# Patient Record
Sex: MALE | Race: BLACK OR AFRICAN AMERICAN | NOT HISPANIC OR LATINO | Employment: OTHER | URBAN - NONMETROPOLITAN AREA
[De-identification: names, ages, dates, MRNs, and addresses within clinical notes are randomized per-mention and may not be internally consistent; named-entity substitution may affect disease eponyms.]

---

## 2021-01-02 ENCOUNTER — HISTORICAL (OUTPATIENT)
Dept: ADMINISTRATIVE | Facility: HOSPITAL | Age: 86
End: 2021-01-02

## 2022-02-16 LAB
AGE: 86
ALBUMIN SERPL-MCNC: 4 G/DL (ref 3.4–5)
ALBUMIN/GLOB SERPL: 1.4 {RATIO}
ALP SERPL-CCNC: 67 U/L (ref 50–144)
ALT SERPL-CCNC: 17 U/L (ref 1–45)
ANION GAP SERPL CALC-SCNC: 7 MMOL/L (ref 2–13)
AST SERPL-CCNC: 27 U/L (ref 17–59)
BASOPHILS # BLD AUTO: 0.01 10*3/UL (ref 0.01–0.08)
BASOPHILS # BLD AUTO: 0.03 10*3/UL (ref 0.01–0.08)
BASOPHILS NFR BLD AUTO: 0.3 % (ref 0.1–1.2)
BASOPHILS NFR BLD AUTO: 0.5 % (ref 0.1–1.2)
BILIRUB SERPL-MCNC: 0.34 MG/DL (ref 0–1)
BUN SERPL-MCNC: 20 MG/DL (ref 7–20)
CALCIUM SERPL-MCNC: 9.5 MG/DL (ref 8.4–10.2)
CHLORIDE SERPL-SCNC: 104 MMOL/L (ref 94–110)
CO2 SERPL-SCNC: 30 MMOL/L (ref 21–32)
CREAT SERPL-MCNC: 1.17 MG/DL (ref 0.66–1.25)
CREAT/UREA NIT SERPL: 17.1 (ref 12–20)
EOSINOPHIL # BLD AUTO: 0.17 10*3/UL (ref 0.04–0.54)
EOSINOPHIL # BLD AUTO: 0.26 10*3/UL (ref 0.04–0.54)
EOSINOPHIL NFR BLD AUTO: 4.3 % (ref 0.7–7)
EOSINOPHIL NFR BLD AUTO: 4.5 % (ref 0.7–7)
ERYTHROCYTE [DISTWIDTH] IN BLOOD BY AUTOMATED COUNT: 14.4 % (ref 11.6–14.4)
ERYTHROCYTE [DISTWIDTH] IN BLOOD BY AUTOMATED COUNT: 14.6 % (ref 11.6–14.4)
EST. AVERAGE GLUCOSE BLD GHB EST-MCNC: 123 MG/DL (ref 70–115)
GLOBULIN SER-MCNC: 2.9 G/DL (ref 2–3.9)
GLUCOSE SERPL-MCNC: 93 MG/DL (ref 70–115)
HBA1C MFR BLD: 6.1 % (ref 4–6)
HCT VFR BLD AUTO: 37.7 % (ref 36–52)
HCT VFR BLD AUTO: 43 % (ref 36–52)
HGB BLD-MCNC: 12.5 G/DL (ref 13–18)
HGB BLD-MCNC: 13.8 G/DL (ref 13–18)
IMM GRANULOCYTES # BLD AUTO: 0.01 10*3/UL (ref 0–0.03)
IMM GRANULOCYTES # BLD AUTO: 0.02 10*3/UL (ref 0–0.03)
IMM GRANULOCYTES NFR BLD AUTO: 0.3 % (ref 0–0.5)
IMM GRANULOCYTES NFR BLD AUTO: 0.3 % (ref 0–0.5)
LYMPHOCYTES # BLD AUTO: 1.46 10*3/UL (ref 1.32–3.57)
LYMPHOCYTES # BLD AUTO: 2.57 10*3/UL (ref 1.32–3.57)
LYMPHOCYTES NFR BLD AUTO: 38.4 % (ref 20–55)
LYMPHOCYTES NFR BLD AUTO: 43 % (ref 20–55)
MANUAL DIFF? (OHS): NORMAL
MANUAL DIFF? (OHS): NORMAL
MCH RBC QN AUTO: 26.5 PG (ref 27–34)
MCH RBC QN AUTO: 26.9 PG (ref 27–34)
MCHC RBC AUTO-ENTMCNC: 32.1 G/DL (ref 31–37)
MCHC RBC AUTO-ENTMCNC: 33.2 G/DL (ref 31–37)
MCV RBC AUTO: 81.1 FL (ref 79–99)
MCV RBC AUTO: 82.5 FL (ref 79–99)
MONOCYTES # BLD AUTO: 0.42 10*3/UL (ref 0.3–0.82)
MONOCYTES # BLD AUTO: 0.54 10*3/UL (ref 0.3–0.82)
MONOCYTES NFR BLD AUTO: 11.1 % (ref 4.7–12.5)
MONOCYTES NFR BLD AUTO: 9 % (ref 4.7–12.5)
NEUTROPHILS # BLD AUTO: 1.73 10*3/UL (ref 1.78–5.38)
NEUTROPHILS # BLD AUTO: 2.56 10*3/UL (ref 1.78–5.38)
NEUTROPHILS NFR BLD AUTO: 42.9 % (ref 37–73)
NEUTROPHILS NFR BLD AUTO: 45.4 % (ref 37–73)
PLATELET # BLD AUTO: 178 10*3/UL (ref 140–371)
PLATELET # BLD AUTO: 187 10*3/UL (ref 140–371)
PMV BLD AUTO: 10.1 FL (ref 9.4–12.4)
PMV BLD AUTO: 10.5 FL (ref 9.4–12.4)
POTASSIUM SERPL-SCNC: 4 MMOL/L (ref 3.5–5.1)
PROT SERPL-MCNC: 6.9 G/DL (ref 6.3–8.2)
RBC # BLD AUTO: 4.65 10*6/UL (ref 4–6)
RBC # BLD AUTO: 5.21 10*6/UL (ref 4–6)
SODIUM SERPL-SCNC: 141 MMOL/L (ref 135–145)
TSH SERPL-ACNC: 0.58 M[IU]/L (ref 0.36–3.74)
URATE SERPL-MCNC: 7.5 MG/DL (ref 2.6–7.2)
VIT B12 SERPL-MCNC: 536 PG/ML (ref 211–946)
WBC # SPEC AUTO: 3.8 10*3/UL (ref 4–11.5)
WBC # SPEC AUTO: 6 10*3/UL (ref 4–11.5)

## 2022-04-11 ENCOUNTER — HISTORICAL (OUTPATIENT)
Dept: ADMINISTRATIVE | Facility: HOSPITAL | Age: 87
End: 2022-04-11

## 2022-04-24 VITALS
WEIGHT: 190.69 LBS | SYSTOLIC BLOOD PRESSURE: 134 MMHG | DIASTOLIC BLOOD PRESSURE: 72 MMHG | HEIGHT: 73 IN | BODY MASS INDEX: 25.27 KG/M2

## 2022-05-11 ENCOUNTER — HISTORICAL (OUTPATIENT)
Dept: ADMINISTRATIVE | Facility: HOSPITAL | Age: 87
End: 2022-05-11

## 2022-05-14 ENCOUNTER — HISTORICAL (OUTPATIENT)
Dept: ADMINISTRATIVE | Facility: HOSPITAL | Age: 87
End: 2022-05-14

## 2023-04-20 ENCOUNTER — OFFICE VISIT (OUTPATIENT)
Dept: FAMILY MEDICINE | Facility: CLINIC | Age: 88
End: 2023-04-20
Payer: MEDICARE

## 2023-04-20 VITALS
BODY MASS INDEX: 24.39 KG/M2 | OXYGEN SATURATION: 100 % | SYSTOLIC BLOOD PRESSURE: 120 MMHG | TEMPERATURE: 98 F | HEIGHT: 73 IN | HEART RATE: 75 BPM | DIASTOLIC BLOOD PRESSURE: 78 MMHG | WEIGHT: 184 LBS

## 2023-04-20 DIAGNOSIS — K40.90 RIGHT INGUINAL HERNIA: ICD-10-CM

## 2023-04-20 PROBLEM — M17.0 OSTEOARTHRITIS OF BOTH KNEES: Status: ACTIVE | Noted: 2023-04-20

## 2023-04-20 PROBLEM — M47.816 SPONDYLOSIS OF LUMBAR SPINE: Status: ACTIVE | Noted: 2023-04-20

## 2023-04-20 PROBLEM — E78.5 HYPERLIPIDEMIA: Status: ACTIVE | Noted: 2023-04-20

## 2023-04-20 PROBLEM — I10 HYPERTENSION: Status: ACTIVE | Noted: 2023-04-20

## 2023-04-20 PROBLEM — M19.90 OSTEOARTHRITIS: Status: ACTIVE | Noted: 2023-04-20

## 2023-04-20 PROBLEM — B19.20 HEPATITIS C VIRUS INFECTION: Status: ACTIVE | Noted: 2023-04-20

## 2023-04-20 PROCEDURE — 99213 OFFICE O/P EST LOW 20 MIN: CPT | Mod: ,,, | Performed by: FAMILY MEDICINE

## 2023-04-20 PROCEDURE — 1160F PR REVIEW ALL MEDS BY PRESCRIBER/CLIN PHARMACIST DOCUMENTED: ICD-10-PCS | Mod: ,,, | Performed by: FAMILY MEDICINE

## 2023-04-20 PROCEDURE — 99213 PR OFFICE/OUTPT VISIT, EST, LEVL III, 20-29 MIN: ICD-10-PCS | Mod: ,,, | Performed by: FAMILY MEDICINE

## 2023-04-20 PROCEDURE — 1101F PT FALLS ASSESS-DOCD LE1/YR: CPT | Mod: ,,, | Performed by: FAMILY MEDICINE

## 2023-04-20 PROCEDURE — 1101F PR PT FALLS ASSESS DOC 0-1 FALLS W/OUT INJ PAST YR: ICD-10-PCS | Mod: ,,, | Performed by: FAMILY MEDICINE

## 2023-04-20 PROCEDURE — 3288F PR FALLS RISK ASSESSMENT DOCUMENTED: ICD-10-PCS | Mod: ,,, | Performed by: FAMILY MEDICINE

## 2023-04-20 PROCEDURE — 1160F RVW MEDS BY RX/DR IN RCRD: CPT | Mod: ,,, | Performed by: FAMILY MEDICINE

## 2023-04-20 PROCEDURE — 3288F FALL RISK ASSESSMENT DOCD: CPT | Mod: ,,, | Performed by: FAMILY MEDICINE

## 2023-04-20 PROCEDURE — 1159F MED LIST DOCD IN RCRD: CPT | Mod: ,,, | Performed by: FAMILY MEDICINE

## 2023-04-20 PROCEDURE — 1159F PR MEDICATION LIST DOCUMENTED IN MEDICAL RECORD: ICD-10-PCS | Mod: ,,, | Performed by: FAMILY MEDICINE

## 2023-04-20 RX ORDER — AMLODIPINE AND BENAZEPRIL HYDROCHLORIDE 10; 20 MG/1; MG/1
1 CAPSULE ORAL DAILY
COMMUNITY
End: 2023-04-20

## 2023-04-20 RX ORDER — FINASTERIDE 5 MG/1
5 TABLET, FILM COATED ORAL DAILY
COMMUNITY

## 2023-04-20 RX ORDER — HYDROCHLOROTHIAZIDE 12.5 MG/1
12.5 CAPSULE ORAL DAILY
COMMUNITY
Start: 2022-11-09

## 2023-04-20 RX ORDER — AMLODIPINE BESYLATE 5 MG/1
5 TABLET ORAL DAILY
COMMUNITY

## 2023-04-20 RX ORDER — SIMVASTATIN 40 MG/1
40 TABLET, FILM COATED ORAL NIGHTLY
COMMUNITY

## 2023-04-20 RX ORDER — OMEPRAZOLE 20 MG/1
20 CAPSULE, DELAYED RELEASE ORAL DAILY
COMMUNITY

## 2023-04-20 NOTE — PROGRESS NOTES
"SUBJECTIVE:  HPI    Dejon Gallegos is a 88 y.o. male here for Groin Swelling (Since last week).  He noticed right groin swelling last week.  No pain.  No discomfort.  No change in his bowel habits.  He has a prior history of right inguinal hernia repair in the 1970s.    Erics allergies, medications, history, and problem list were updated as appropriate.    ROS:  Pertinent ROS as above, otherwise negative    OBJECTIVE:  Vital signs  Visit Vitals  /78 (BP Location: Left arm, Patient Position: Sitting, BP Method: Medium (Manual))   Pulse 75   Temp 98.2 °F (36.8 °C) (Oral)   Ht 6' 1.03" (1.855 m)   Wt 83.5 kg (184 lb)   SpO2 100%   BMI 24.26 kg/m²          PHYSICAL EXAM:  General:  Awake, alert, no acute distress  :  Reducible, nontender right femoral/inguinal hernia      ASSESSMENT/PLAN:  1. Right inguinal hernia  Assessment & Plan:  The patient reports that he would like to go to [place] at the [facility] to have this fixed.  He does not have any family locally but he does have a daughter who lives in [place] that would be able to care for him in the perioperative period.        "

## 2023-04-20 NOTE — ASSESSMENT & PLAN NOTE
The patient reports that he would like to go to [place] at the [facility] to have this fixed.  He does not have any family locally but he does have a daughter who lives in [place] that would be able to care for him in the perioperative period.

## 2023-05-15 ENCOUNTER — APPOINTMENT (OUTPATIENT)
Dept: RADIOLOGY | Facility: CLINIC | Age: 88
End: 2023-05-15
Attending: FAMILY MEDICINE
Payer: MEDICARE

## 2023-05-15 ENCOUNTER — OFFICE VISIT (OUTPATIENT)
Dept: FAMILY MEDICINE | Facility: CLINIC | Age: 88
End: 2023-05-15
Payer: MEDICARE

## 2023-05-15 VITALS
WEIGHT: 185.81 LBS | SYSTOLIC BLOOD PRESSURE: 92 MMHG | HEART RATE: 59 BPM | OXYGEN SATURATION: 98 % | DIASTOLIC BLOOD PRESSURE: 56 MMHG | BODY MASS INDEX: 24.63 KG/M2 | TEMPERATURE: 98 F | HEIGHT: 73 IN

## 2023-05-15 DIAGNOSIS — M47.816 SPONDYLOSIS OF LUMBAR SPINE: ICD-10-CM

## 2023-05-15 DIAGNOSIS — M47.816 SPONDYLOSIS OF LUMBAR SPINE: Primary | ICD-10-CM

## 2023-05-15 PROCEDURE — 99214 PR OFFICE/OUTPT VISIT, EST, LEVL IV, 30-39 MIN: ICD-10-PCS | Mod: ,,, | Performed by: FAMILY MEDICINE

## 2023-05-15 PROCEDURE — 1159F PR MEDICATION LIST DOCUMENTED IN MEDICAL RECORD: ICD-10-PCS | Mod: ,,, | Performed by: FAMILY MEDICINE

## 2023-05-15 PROCEDURE — 1160F RVW MEDS BY RX/DR IN RCRD: CPT | Mod: ,,, | Performed by: FAMILY MEDICINE

## 2023-05-15 PROCEDURE — 72114 X-RAY EXAM L-S SPINE BENDING: CPT | Mod: TC,RHCUB | Performed by: FAMILY MEDICINE

## 2023-05-15 PROCEDURE — 1159F MED LIST DOCD IN RCRD: CPT | Mod: ,,, | Performed by: FAMILY MEDICINE

## 2023-05-15 PROCEDURE — 3288F FALL RISK ASSESSMENT DOCD: CPT | Mod: ,,, | Performed by: FAMILY MEDICINE

## 2023-05-15 PROCEDURE — 3288F PR FALLS RISK ASSESSMENT DOCUMENTED: ICD-10-PCS | Mod: ,,, | Performed by: FAMILY MEDICINE

## 2023-05-15 PROCEDURE — 1160F PR REVIEW ALL MEDS BY PRESCRIBER/CLIN PHARMACIST DOCUMENTED: ICD-10-PCS | Mod: ,,, | Performed by: FAMILY MEDICINE

## 2023-05-15 PROCEDURE — 99214 OFFICE O/P EST MOD 30 MIN: CPT | Mod: ,,, | Performed by: FAMILY MEDICINE

## 2023-05-15 PROCEDURE — 1101F PT FALLS ASSESS-DOCD LE1/YR: CPT | Mod: ,,, | Performed by: FAMILY MEDICINE

## 2023-05-15 PROCEDURE — 1101F PR PT FALLS ASSESS DOC 0-1 FALLS W/OUT INJ PAST YR: ICD-10-PCS | Mod: ,,, | Performed by: FAMILY MEDICINE

## 2023-05-15 RX ORDER — IBUPROFEN 600 MG/1
600 TABLET ORAL EVERY 8 HOURS
Qty: 63 TABLET | Refills: 0 | Status: SHIPPED | OUTPATIENT
Start: 2023-05-15 | End: 2023-06-05

## 2023-05-15 NOTE — PROGRESS NOTES
"SUBJECTIVE:  HPI    Dejon Gallegos is a 88 y.o. male here for Back Pain.  1-2 week history of left-sided low back pain that began gradually when he awakened 1 morning.  He denies any exacerbating injury.  The pain is worsened whenever he flexes to tie his shoes or when he stands up quickly.  He denies any radicular pain.  He denies any fever, chills, dysuria, change in voiding habits.  He denies any rectal pain or pressure.      Erics allergies, medications, history, and problem list were updated as appropriate.    ROS:  Pertinent ROS as above, otherwise negative    OBJECTIVE:  Vital signs  Visit Vitals  BP (!) 92/56 (BP Location: Right arm, Patient Position: Sitting)   Pulse (!) 59   Temp 98.1 °F (36.7 °C) (Temporal)   Ht 6' 1.03" (1.855 m)   Wt 84.3 kg (185 lb 12.8 oz)   SpO2 98%   BMI 24.49 kg/m²          PHYSICAL EXAM:  General:  Awake, alert, no acute distress  Back:  Tenderness to palpation just to the left of midline near L5 and S1.  Straight leg raise negative bilaterally.  Pain with hyperextension.  Neuro:  Motor 4/5 bilateral lower extremities, equal and symmetrical.  Sensation light touch intact.    Lumbar spine x-rays, my interpretation prior to radiology report:  Severe L5 and S1 degenerative joint disease and degenerative disc disease changes with moderate-severe disease superior to L5.  Bridging spurs and spondylosis the entire lumbar spine.      ASSESSMENT/PLAN:  1. Spondylosis of lumbar spine  Overview:  X-rays June 27, 2017: Severe L5 and S1 degenerative joint disease and degenerative disc disease with moderate degenerative changes above L5    Orders:  -     X-Ray Lumbar Complete Including Flex And Ext; Future; Expected date: 05/15/2023  -     ibuprofen (ADVIL,MOTRIN) 600 MG tablet; Take 1 tablet (600 mg total) by mouth every 8 (eight) hours. for 21 days  Dispense: 63 tablet; Refill: 0    Ibuprofen 600 mg 3 times a day for 21 days.      Offered physical therapy referral.  Patient declined at this " time.

## 2024-07-22 DIAGNOSIS — R31.9 HEMATURIA: Primary | ICD-10-CM

## 2024-08-01 DIAGNOSIS — J44.9 COPD (CHRONIC OBSTRUCTIVE PULMONARY DISEASE): Primary | ICD-10-CM

## 2024-08-14 ENCOUNTER — OFFICE VISIT (OUTPATIENT)
Dept: UROLOGY | Facility: CLINIC | Age: 89
End: 2024-08-14
Payer: OTHER GOVERNMENT

## 2024-08-14 VITALS — HEIGHT: 75 IN | BODY MASS INDEX: 23.22 KG/M2

## 2024-08-14 DIAGNOSIS — R31.21 ASYMPTOMATIC MICROSCOPIC HEMATURIA: Primary | ICD-10-CM

## 2024-08-14 LAB
BILIRUBIN, UA POC OHS: NEGATIVE
BLOOD, UA POC OHS: ABNORMAL
CLARITY, UA POC OHS: ABNORMAL
COLOR, UA POC OHS: YELLOW
GLUCOSE, UA POC OHS: NEGATIVE
KETONES, UA POC OHS: NEGATIVE
LEUKOCYTES, UA POC OHS: ABNORMAL
NITRITE, UA POC OHS: NEGATIVE
PH, UA POC OHS: 6.5
PROTEIN, UA POC OHS: NEGATIVE
SPECIFIC GRAVITY, UA POC OHS: 1.02
UROBILINOGEN, UA POC OHS: 0.2

## 2024-08-14 PROCEDURE — 99204 OFFICE O/P NEW MOD 45 MIN: CPT | Mod: S$GLB,,,

## 2024-08-14 PROCEDURE — 81003 URINALYSIS AUTO W/O SCOPE: CPT | Mod: QW,S$GLB,,

## 2024-08-14 RX ORDER — TERAZOSIN 5 MG/1
CAPSULE ORAL
COMMUNITY
Start: 2024-01-23 | End: 2025-01-23

## 2024-08-14 RX ORDER — LATANOPROST 50 UG/ML
SOLUTION/ DROPS OPHTHALMIC
COMMUNITY
Start: 2024-03-06

## 2024-08-14 RX ORDER — GABAPENTIN 600 MG/1
TABLET ORAL
COMMUNITY
Start: 2024-01-05 | End: 2025-01-05

## 2024-08-14 RX ORDER — LANOLIN ALCOHOL/MO/W.PET/CERES
1000 CREAM (GRAM) TOPICAL
COMMUNITY
Start: 2024-07-05

## 2024-08-14 NOTE — PROGRESS NOTES
Subjective:       Patient ID: Dejon Gallegos is a 89 y.o. male.    Chief Complaint: No chief complaint on file.      HPI: 89-year-old male new patient referred for microscopic hematuria.  [facility] and his wellness urinalysis indicated microscopic blood.  Patient reports he was seen by Dr. Spencer greater than 5 years ago for microscopic hematuria and a growth was found in his bladder by cystoscopy.  He reports that the pathology was negative for cancer.  He has not had any routine follow up cystoscopy since.  He reports he was a previous smoker that quit 26 years ago.  He smoked 1 pack per week.  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  He has no known family history of kidney or bladder cancer.  He denies all lower urinary tract symptoms       Past Medical History:   Past Medical History:   Diagnosis Date    GERD (gastroesophageal reflux disease)     Hepatitis C virus infection 4/20/2023    Hep C AB positive-- Hep C virus [facility]    Hyperlipidemia 4/20/2023    Hypertension 4/20/2023    Osteoarthritis 4/20/2023    C-spine, hips, left knee (end stage)    Osteoarthritis of both knees 4/20/2023 June 27, 2017: X-rays show bilateral severe osteoarthritis with significant joint and patellar changesx-rays bilaterally in office    Spondylosis of lumbar spine 4/20/2023    X-rays June 27, 2017: Severe L5 and S1 degenerative joint disease and degenerative disc disease with moderate degenerative changes above L5       Past Surgical Historical:   Past Surgical History:   Procedure Laterality Date    CATARACT EXTRACTION      HERNIA REPAIR Right 1979        Medications:   Medication List with Changes/Refills   Current Medications    AMLODIPINE (NORVASC) 5 MG TABLET    Take 5 mg by mouth once daily.    FINASTERIDE (PROSCAR) 5 MG TABLET    Take 5 mg  by mouth once daily.    HYDROCHLOROTHIAZIDE (MICROZIDE) 12.5 MG CAPSULE    Take 12.5 mg by mouth once daily.    OMEPRAZOLE (PRILOSEC) 20 MG CAPSULE    Take 20 mg by mouth once daily.    SIMVASTATIN (ZOCOR) 40 MG TABLET    Take 40 mg by mouth every evening.        Past Social History:   Social History     Socioeconomic History    Marital status:    Tobacco Use    Smoking status: Former     Current packs/day: 0.00     Types: Cigarettes     Quit date:      Years since quittin.6    Smokeless tobacco: Never   Substance and Sexual Activity    Alcohol use: Not Currently       Allergies:   Review of patient's allergies indicates:   Allergen Reactions    Penicillin      Other reaction(s): PASSED OUT        Family History: No family history on file.     Review of Systems:  Review of Systems   Constitutional: Negative.    HENT: Negative.     Eyes: Negative.    Respiratory: Negative.     Cardiovascular: Negative.    Gastrointestinal: Negative.    Endocrine: Negative.    Genitourinary: Negative.    Musculoskeletal: Negative.    Skin: Negative.    Allergic/Immunologic: Negative.    Neurological: Negative.    Hematological: Negative.    Psychiatric/Behavioral: Negative.       Physical Exam:  Physical Exam  Constitutional:       Appearance: Normal appearance.   Cardiovascular:      Rate and Rhythm: Normal rate.   Pulmonary:      Effort: Pulmonary effort is normal.   Abdominal:      General: Bowel sounds are normal.      Palpations: Abdomen is soft.   Neurological:      Mental Status: He is alert and oriented to person, place, and time.   Urinalysis: WBCs 20 5-50, RBCs 3-5 small, epithelial +1, bacteria 3+, nitrite negative  Assessment/Plan:     Asymptomatic microscopic hematuria:  Patient has an abnormal urinalysis however he denies lower urinary tract symptoms.  We will send for culture and treat if indicated.  We will also set patient up for CT urogram and cystoscopy with Dr. Black due to his history of bladder  growth.    Follow up to be arranged pending results of CT and cysto  Problem List Items Addressed This Visit    None  Visit Diagnoses       Asymptomatic microscopic hematuria    -  Primary    Relevant Orders    Urine culture    Cystoscopy    CT Urogram Abd Pelvis W WO    Creatinine, serum

## 2024-08-17 LAB — URINE CULTURE, ROUTINE: NORMAL

## 2024-08-19 ENCOUNTER — TELEPHONE (OUTPATIENT)
Dept: UROLOGY | Facility: CLINIC | Age: 89
End: 2024-08-19
Payer: OTHER GOVERNMENT

## 2024-08-19 RX ORDER — NITROFURANTOIN 25; 75 MG/1; MG/1
100 CAPSULE ORAL 2 TIMES DAILY
Qty: 10 CAPSULE | Refills: 0 | Status: SHIPPED | OUTPATIENT
Start: 2024-08-19 | End: 2024-08-24

## 2024-08-19 NOTE — TELEPHONE ENCOUNTER
Attempted to contact pt regarding results. Left VM----- Message from Sarah Arshad NP sent at 8/19/2024  8:07 AM CDT -----  Please call and inform patient his urine culture was positive for Enterococcus.  I am sending him a prescription for Macrobid to his pharmacy on file

## 2024-09-12 ENCOUNTER — LAB VISIT (OUTPATIENT)
Dept: LAB | Facility: HOSPITAL | Age: 89
End: 2024-09-12
Attending: FAMILY MEDICINE
Payer: MEDICARE

## 2024-09-12 DIAGNOSIS — I10 HYPERTENSION, UNSPECIFIED TYPE: ICD-10-CM

## 2024-09-12 DIAGNOSIS — Z00.00 WELLNESS EXAMINATION: ICD-10-CM

## 2024-09-12 DIAGNOSIS — M19.90 OSTEOARTHRITIS, UNSPECIFIED OSTEOARTHRITIS TYPE, UNSPECIFIED SITE: ICD-10-CM

## 2024-09-12 DIAGNOSIS — E78.5 HYPERLIPIDEMIA, UNSPECIFIED HYPERLIPIDEMIA TYPE: ICD-10-CM

## 2024-09-12 LAB
ALBUMIN SERPL-MCNC: 4.1 G/DL (ref 3.4–5)
ALBUMIN/GLOB SERPL: 1.4 RATIO
ALP SERPL-CCNC: 64 UNIT/L (ref 50–144)
ALT SERPL-CCNC: 14 UNIT/L (ref 1–45)
ANION GAP SERPL CALC-SCNC: 8 MEQ/L (ref 2–13)
AST SERPL-CCNC: 23 UNIT/L (ref 17–59)
BASOPHILS # BLD AUTO: 0.02 X10(3)/MCL (ref 0.01–0.08)
BASOPHILS NFR BLD AUTO: 0.4 % (ref 0.1–1.2)
BILIRUB SERPL-MCNC: 0.5 MG/DL (ref 0–1)
BUN SERPL-MCNC: 19 MG/DL (ref 7–20)
CALCIUM SERPL-MCNC: 10.3 MG/DL (ref 8.4–10.2)
CHLORIDE SERPL-SCNC: 104 MMOL/L (ref 98–110)
CHOLEST SERPL-MCNC: 156 MG/DL (ref 0–200)
CO2 SERPL-SCNC: 28 MMOL/L (ref 21–32)
CREAT SERPL-MCNC: 1.35 MG/DL (ref 0.66–1.25)
CREAT/UREA NIT SERPL: 14 (ref 12–20)
EOSINOPHIL # BLD AUTO: 0.26 X10(3)/MCL (ref 0.04–0.54)
EOSINOPHIL NFR BLD AUTO: 4.6 % (ref 0.7–7)
ERYTHROCYTE [DISTWIDTH] IN BLOOD BY AUTOMATED COUNT: 14.1 %
EST. AVERAGE GLUCOSE BLD GHB EST-MCNC: 131.2 MG/DL (ref 70–115)
GFR SERPLBLD CREATININE-BSD FMLA CKD-EPI: 50 ML/MIN/1.73/M2
GLOBULIN SER-MCNC: 3 GM/DL (ref 2–3.9)
GLUCOSE SERPL-MCNC: 126 MG/DL (ref 70–115)
HBA1C MFR BLD: 6.2 % (ref 4–6)
HCT VFR BLD AUTO: 43.1 % (ref 36–52)
HDLC SERPL-MCNC: 73 MG/DL (ref 40–60)
HGB BLD-MCNC: 13.9 G/DL (ref 13–18)
IMM GRANULOCYTES # BLD AUTO: 0.01 X10(3)/MCL (ref 0–0.03)
IMM GRANULOCYTES NFR BLD AUTO: 0.2 % (ref 0–0.5)
LDLC SERPL DIRECT ASSAY-SCNC: 55.5 MG/DL (ref 30–100)
LYMPHOCYTES # BLD AUTO: 2.32 X10(3)/MCL (ref 1.32–3.57)
LYMPHOCYTES NFR BLD AUTO: 40.8 % (ref 20–55)
MCH RBC QN AUTO: 26.8 PG (ref 27–34)
MCHC RBC AUTO-ENTMCNC: 32.3 G/DL (ref 31–37)
MCV RBC AUTO: 83 FL (ref 79–99)
MONOCYTES # BLD AUTO: 0.45 X10(3)/MCL (ref 0.3–0.82)
MONOCYTES NFR BLD AUTO: 7.9 % (ref 4.7–12.5)
NEUTROPHILS # BLD AUTO: 2.62 X10(3)/MCL (ref 1.78–5.38)
NEUTROPHILS NFR BLD AUTO: 46.1 % (ref 37–73)
NRBC BLD AUTO-RTO: 0 %
PLATELET # BLD AUTO: 175 X10(3)/MCL (ref 140–371)
PMV BLD AUTO: 9.8 FL (ref 9.4–12.4)
POTASSIUM SERPL-SCNC: 3.7 MMOL/L (ref 3.5–5.1)
PROT SERPL-MCNC: 7.1 GM/DL (ref 6.3–8.2)
RBC # BLD AUTO: 5.19 X10(6)/MCL (ref 4–6)
SODIUM SERPL-SCNC: 140 MMOL/L (ref 136–145)
TRIGL SERPL-MCNC: 100 MG/DL (ref 30–200)
TSH SERPL-ACNC: 2.66 UIU/ML (ref 0.36–3.74)
WBC # BLD AUTO: 5.68 X10(3)/MCL (ref 4–11.5)

## 2024-09-12 PROCEDURE — 80053 COMPREHEN METABOLIC PANEL: CPT

## 2024-09-12 PROCEDURE — 84443 ASSAY THYROID STIM HORMONE: CPT

## 2024-09-12 PROCEDURE — 80061 LIPID PANEL: CPT

## 2024-09-12 PROCEDURE — 85025 COMPLETE CBC W/AUTO DIFF WBC: CPT

## 2024-09-12 PROCEDURE — 36415 COLL VENOUS BLD VENIPUNCTURE: CPT

## 2024-09-12 PROCEDURE — 83036 HEMOGLOBIN GLYCOSYLATED A1C: CPT

## 2024-09-20 ENCOUNTER — HOSPITAL ENCOUNTER (OUTPATIENT)
Dept: RADIOLOGY | Facility: HOSPITAL | Age: 89
Discharge: HOME | End: 2024-09-20
Attending: STUDENT IN AN ORGANIZED HEALTH CARE EDUCATION/TRAINING PROGRAM
Payer: OTHER GOVERNMENT

## 2024-09-20 DIAGNOSIS — R60.9 SWELLING: ICD-10-CM

## 2024-09-20 PROCEDURE — 74176 CT ABD & PELVIS W/O CONTRAST: CPT | Mod: TC

## 2024-11-12 ENCOUNTER — OFFICE VISIT (OUTPATIENT)
Dept: FAMILY MEDICINE | Facility: CLINIC | Age: 89
End: 2024-11-12
Payer: MEDICARE

## 2024-11-12 VITALS
BODY MASS INDEX: 20.89 KG/M2 | SYSTOLIC BLOOD PRESSURE: 102 MMHG | WEIGHT: 168 LBS | OXYGEN SATURATION: 98 % | DIASTOLIC BLOOD PRESSURE: 70 MMHG | HEIGHT: 75 IN | TEMPERATURE: 97 F | HEART RATE: 107 BPM

## 2024-11-12 DIAGNOSIS — R63.4 WEIGHT LOSS: ICD-10-CM

## 2024-11-12 DIAGNOSIS — R73.09 ELEVATED GLUCOSE: ICD-10-CM

## 2024-11-12 DIAGNOSIS — Z00.01 ENCOUNTER FOR WELL ADULT EXAM WITH ABNORMAL FINDINGS: Primary | ICD-10-CM

## 2024-11-12 DIAGNOSIS — E78.49 OTHER HYPERLIPIDEMIA: ICD-10-CM

## 2024-11-12 DIAGNOSIS — R31.29 OTHER MICROSCOPIC HEMATURIA: ICD-10-CM

## 2024-11-12 DIAGNOSIS — M15.0 PRIMARY OSTEOARTHRITIS INVOLVING MULTIPLE JOINTS: ICD-10-CM

## 2024-11-12 DIAGNOSIS — M17.0 PRIMARY OSTEOARTHRITIS OF BOTH KNEES: ICD-10-CM

## 2024-11-12 DIAGNOSIS — I10 BENIGN ESSENTIAL HYPERTENSION: ICD-10-CM

## 2024-11-12 LAB
ALBUMIN SERPL-MCNC: 4.2 G/DL (ref 3.4–5)
ALBUMIN/GLOB SERPL: 1.4 RATIO
ALP SERPL-CCNC: 70 UNIT/L (ref 50–144)
ALT SERPL-CCNC: 15 UNIT/L (ref 1–45)
ANION GAP SERPL CALC-SCNC: 6 MEQ/L (ref 2–13)
AST SERPL-CCNC: 23 UNIT/L (ref 17–59)
BASOPHILS # BLD AUTO: 0.03 X10(3)/MCL (ref 0.01–0.08)
BASOPHILS NFR BLD AUTO: 0.5 % (ref 0.1–1.2)
BILIRUB SERPL-MCNC: 0.5 MG/DL (ref 0–1)
BUN SERPL-MCNC: 20 MG/DL (ref 7–20)
CALCIUM SERPL-MCNC: 10.3 MG/DL (ref 8.4–10.2)
CHLORIDE SERPL-SCNC: 101 MMOL/L (ref 98–110)
CO2 SERPL-SCNC: 32 MMOL/L (ref 21–32)
CREAT SERPL-MCNC: 1.09 MG/DL (ref 0.66–1.25)
CREAT/UREA NIT SERPL: 18 (ref 12–20)
EOSINOPHIL # BLD AUTO: 0.22 X10(3)/MCL (ref 0.04–0.54)
EOSINOPHIL NFR BLD AUTO: 3.9 % (ref 0.7–7)
ERYTHROCYTE [DISTWIDTH] IN BLOOD BY AUTOMATED COUNT: 14 %
GFR SERPLBLD CREATININE-BSD FMLA CKD-EPI: 65 ML/MIN/1.73/M2
GLOBULIN SER-MCNC: 2.9 GM/DL (ref 2–3.9)
GLUCOSE SERPL-MCNC: 150 MG/DL (ref 70–115)
HCT VFR BLD AUTO: 42.2 % (ref 36–52)
HGB BLD-MCNC: 14.1 G/DL (ref 13–18)
IMM GRANULOCYTES # BLD AUTO: 0.02 X10(3)/MCL (ref 0–0.03)
IMM GRANULOCYTES NFR BLD AUTO: 0.4 % (ref 0–0.5)
LYMPHOCYTES # BLD AUTO: 2.12 X10(3)/MCL (ref 1.32–3.57)
LYMPHOCYTES NFR BLD AUTO: 37.5 % (ref 20–55)
MCH RBC QN AUTO: 26.9 PG (ref 27–34)
MCHC RBC AUTO-ENTMCNC: 33.4 G/DL (ref 31–37)
MCV RBC AUTO: 80.4 FL (ref 79–99)
MONOCYTES # BLD AUTO: 0.38 X10(3)/MCL (ref 0.3–0.82)
MONOCYTES NFR BLD AUTO: 6.7 % (ref 4.7–12.5)
NEUTROPHILS # BLD AUTO: 2.89 X10(3)/MCL (ref 1.78–5.38)
NEUTROPHILS NFR BLD AUTO: 51 % (ref 37–73)
NRBC BLD AUTO-RTO: 0 %
PLATELET # BLD AUTO: 201 X10(3)/MCL (ref 140–371)
PMV BLD AUTO: 10.3 FL (ref 9.4–12.4)
POTASSIUM SERPL-SCNC: 4.1 MMOL/L (ref 3.5–5.1)
PROT SERPL-MCNC: 7.1 GM/DL (ref 6.3–8.2)
PSA SERPL-MCNC: 1.26 NG/ML
RBC # BLD AUTO: 5.25 X10(6)/MCL (ref 4–6)
SODIUM SERPL-SCNC: 139 MMOL/L (ref 136–145)
T4 FREE SERPL-MCNC: 1.32 NG/DL (ref 0.78–2.19)
TSH SERPL-ACNC: 0.95 UIU/ML (ref 0.36–3.74)
WBC # BLD AUTO: 5.66 X10(3)/MCL (ref 4–11.5)

## 2024-11-12 PROCEDURE — 99497 ADVNCD CARE PLAN 30 MIN: CPT | Mod: ,,, | Performed by: FAMILY MEDICINE

## 2024-11-12 PROCEDURE — 99214 OFFICE O/P EST MOD 30 MIN: CPT | Mod: 25,,, | Performed by: FAMILY MEDICINE

## 2024-11-12 PROCEDURE — 84153 ASSAY OF PSA TOTAL: CPT | Performed by: FAMILY MEDICINE

## 2024-11-12 PROCEDURE — 83036 HEMOGLOBIN GLYCOSYLATED A1C: CPT | Performed by: FAMILY MEDICINE

## 2024-11-12 PROCEDURE — 1160F RVW MEDS BY RX/DR IN RCRD: CPT | Mod: ,,, | Performed by: FAMILY MEDICINE

## 2024-11-12 PROCEDURE — 84439 ASSAY OF FREE THYROXINE: CPT | Performed by: FAMILY MEDICINE

## 2024-11-12 PROCEDURE — G0439 PPPS, SUBSEQ VISIT: HCPCS | Mod: ,,, | Performed by: FAMILY MEDICINE

## 2024-11-12 PROCEDURE — 1159F MED LIST DOCD IN RCRD: CPT | Mod: ,,, | Performed by: FAMILY MEDICINE

## 2024-11-12 PROCEDURE — 3288F FALL RISK ASSESSMENT DOCD: CPT | Mod: ,,, | Performed by: FAMILY MEDICINE

## 2024-11-12 PROCEDURE — 84443 ASSAY THYROID STIM HORMONE: CPT | Performed by: FAMILY MEDICINE

## 2024-11-12 PROCEDURE — 1101F PT FALLS ASSESS-DOCD LE1/YR: CPT | Mod: ,,, | Performed by: FAMILY MEDICINE

## 2024-11-12 PROCEDURE — 85025 COMPLETE CBC W/AUTO DIFF WBC: CPT | Performed by: FAMILY MEDICINE

## 2024-11-12 PROCEDURE — 80053 COMPREHEN METABOLIC PANEL: CPT | Performed by: FAMILY MEDICINE

## 2024-11-12 NOTE — PROGRESS NOTES
Internal Medicine      Patient ID: [id number]     Chief Complaint: Medicare Annual Wellness     HPI:     Dejon Gallegos is a 89 y.o. male here today for a Medicare Annual Wellness visit and comprehensive Health Risk Assessment.     A separate E/M code has been provided to evaluate additional complaints that the patient would like addressed during the dedicated Medicare Wellness Exam.    Labs from 2024 reviewed     See assessment and plan for problems addressed during the visit.    Patient complains of anorexia and weight loss over the last 2-3 months.  Otherwise, no new complaints.      It is noted that he saw the urologist for some microscopic hematuria in August and had an essentially negative CT scan    Health Maintenance   Topic Date Due    TETANUS VACCINE  2020    Lipid Panel  2029    Shingles Vaccine  Completed        Past Medical History:   Diagnosis Date    GERD (gastroesophageal reflux disease)     Hepatitis C virus infection 2023    Hep C AB positive-- [facility]    Hyperlipidemia 2023    Hypertension 2023    Osteoarthritis 2023    C-spine, hips, left knee (end stage)    Osteoarthritis of both knees 2023: X-rays show bilateral severe osteoarthritis with significant joint and patellar changesx-rays bilaterally in office    Spondylosis of lumbar spine 2023    X-rays 2017: Severe L5 and S1 degenerative joint disease and degenerative disc disease with moderate degenerative changes above L5        Past Surgical History:   Procedure Laterality Date    CATARACT EXTRACTION      HERNIA REPAIR Right         Social History     Socioeconomic History    Marital status:    Tobacco Use    Smoking status: Former     Current packs/day: 0.00     Types: Cigarettes     Quit date:      Years since quittin.8    Smokeless tobacco: Never   Substance and Sexual Activity    Alcohol use: Not  "Currently        No family history on file.     Current Outpatient Medications   Medication Instructions    amLODIPine (NORVASC) 5 mg, Daily    cholecalciferol (vitamin D3) 10 mcg    cyanocobalamin (VITAMIN B-12) 1,000 mcg    finasteride (PROSCAR) 5 mg, Daily    gabapentin (NEURONTIN) 600 MG tablet TAKE ONE-HALF TABLET BY MOUTH AT BEDTIME FOR NEUROPATHIC PAIN    hydroCHLOROthiazide (MICROZIDE) 12.5 mg, Daily    latanoprost 0.005 % ophthalmic solution INSTILL 1 DROP INTO BOTH EYE EVERY NIGHT BEFORE BE    omeprazole (PRILOSEC) 20 mg, Daily    simvastatin (ZOCOR) 40 mg, Nightly    terazosin (HYTRIN) 5 MG capsule TAKE ONE CAPSULE BY MOUTH AT BEDTIME FOR URINE FLOW       Review of patient's allergies indicates:   Allergen Reactions    Penicillin      Other reaction(s): PASSED OUT        Immunization History   Administered Date(s) Administered    COVID-19, MRNA, LN-S, PF (MODERNA FULL 0.5 ML DOSE) 02/05/2021, 03/05/2021, 11/10/2021    Dtap, Unspecified Formulation 09/05/2012    Influenza 11/18/1997, 01/28/1999, 01/05/2004, 11/04/2004, 10/14/2005, 11/06/2007, 12/15/2010, 10/30/2013, 10/29/2014, 10/25/2016, 10/26/2017, 12/20/2019, 11/08/2022    Influenza - Quadrivalent - High Dose - PF (65 years and older) 02/16/2022    Influenza - Quadrivalent - PF *Preferred* (6 months and older) 12/15/2020    Influenza Whole 01/28/2000, 01/29/2001    Pneumococcal 01/05/2004, 06/22/2009    Pneumococcal Conjugate - 13 Valent 04/26/2017    Td (Adult), Unspecified Formulation 01/28/2000, 06/23/2010    Zoster Recombinant 06/05/2023, 07/05/2024        Patient Care Team:  Abhijit Montesinos MD as PCP - General (Family Medicine)    Subjective:     Review of Systems    12 point review of systems conducted, negative except as stated in the history of present illness. See HPI for details.    Objective:     Visit Vitals  /70 (BP Location: Left arm, Patient Position: Sitting)   Pulse 107   Temp 97.2 °F (36.2 °C) (Oral)   Ht 6' 3" (1.905 m) "   Wt 76.2 kg (168 lb)   SpO2 98%   BMI 21.00 kg/m²     Weight has decreased 10 lb from August and 17 total lb from May of 2023    Physical Exam  Vitals and nursing note reviewed.   Constitutional:       Appearance: Normal appearance.   HENT:      Head: Normocephalic and atraumatic.   Eyes:      Conjunctiva/sclera: Conjunctivae normal.      Pupils: Pupils are equal, round, and reactive to light.   Cardiovascular:      Rate and Rhythm: Normal rate and regular rhythm.      Heart sounds: Normal heart sounds.   Pulmonary:      Effort: Pulmonary effort is normal.      Breath sounds: Normal breath sounds.   Abdominal:      General: Abdomen is flat. Bowel sounds are normal.      Palpations: Abdomen is soft.   Musculoskeletal:      Cervical back: Normal range of motion.   Skin:     General: Skin is warm and dry.      Capillary Refill: Capillary refill takes less than 2 seconds.   Neurological:      General: No focal deficit present.      Mental Status: He is alert and oriented to person, place, and time.   Psychiatric:         Mood and Affect: Mood normal.         Thought Content: Thought content normal.         Judgment: Judgment normal.         Assessment:       ICD-10-CM ICD-9-CM   1. Encounter for well adult exam with abnormal findings  Z00.01 V70.0   2. Benign essential hypertension  I10 401.1   3. Other hyperlipidemia  E78.49 272.4   4. Primary osteoarthritis of both knees  M17.0 715.16   5. Primary osteoarthritis involving multiple joints  M15.0 715.98   6. Weight loss  R63.4 783.21   7. Other microscopic hematuria  R31.29 599.72        Plan:     1. Encounter for well adult exam with abnormal findings    2. Benign essential hypertension  Assessment & Plan:  Controlled on amlodipine 5 mg daily, hydrochlorothiazide 12.5 mg daily, terazosin 5 mg daily      3. Other hyperlipidemia  Overview:  Lab Results   Component Value Date    CHOL 156 09/12/2024    HDL 73 (H) 09/12/2024    LDLDIRECT 55.5 09/12/2024    TRIG 100  09/12/2024            Assessment & Plan:  LDL goal less than 100    Simvastatin 40 mg daily      4. Primary osteoarthritis of both knees  Overview:  June 27, 2017: X-rays show bilateral severe osteoarthritis with significant joint and patellar changesx-rays bilaterally in office      5. Primary osteoarthritis involving multiple joints  Overview:  C-spine, hips, left knee (end stage)      6. Weight loss  Assessment & Plan:  In light of his anorexia and weight loss, would like to recheck some labs today including a CBC, CMP, PSA, TSH, free T4     Consider chest x-ray and further evaluation after review of labs    We will call patient with results and plans    Orders:  -     TSH; Future; Expected date: 11/12/2024  -     T4, Free; Future; Expected date: 11/12/2024  -     PSA, Total (Diagnostic); Future; Expected date: 11/12/2024  -     CBC Auto Differential; Future; Expected date: 11/12/2024  -     Comprehensive Metabolic Panel; Future; Expected date: 11/12/2024    7. Other microscopic hematuria  -     PSA, Total (Diagnostic); Future; Expected date: 11/12/2024         The following assessments were completed and reviewed. See completed screening forms and assessments within the Encounter Summary.   [x] Health Risk Assessment   [x] CVD Risk Factors   [x] Obesity/Physical Activity -  Encouraged daily 30 minute physical activity x 5 days per week.   [x] Home Safety/Living Situation   [x] Alcohol Screen  [x] Depression (PHQ) Screen   [x] Timed Get Up and Go   [x] Whisper Test  [x] Cognitive Function/Impairment Screen   [x] Nutrition Screening  [x] ADL Screen   [x] Opioid Screen:  [x] Patient does not have a prescription for opioids.   [] Patient has a prescription for opioids but is at low risk for abuse.   [x] Substance Abuse Screen:   [x] Patient does not use substances.   [] Patient screens positive for substance use disorder.   Advance Care Planning     Date: 11/12/2024  I discussed advanced care planning with the  patient including how to pick a person who would make decisions for them if they were unable to make a decision for themselves, called a health care power of .  In addition, we discussed the types of decisions a patient may need to make such as use of life-sustaining treatments, including but not limited to the use of ventilators, artificial feeding tubes, CPR when faced with a life-limiting illness and how these should be recorded on a living will.     I have provided a 5 Wishes handout to the patient for completion.      Abhijit Montesinos MD

## 2024-11-12 NOTE — ASSESSMENT & PLAN NOTE
In light of his anorexia and weight loss, would like to recheck some labs today including a CBC, CMP, PSA, TSH, free T4     Consider chest x-ray and further evaluation after review of labs    We will call patient with results and plans

## 2024-11-13 ENCOUNTER — TELEPHONE (OUTPATIENT)
Dept: FAMILY MEDICINE | Facility: CLINIC | Age: 89
End: 2024-11-13
Payer: MEDICARE

## 2024-11-13 DIAGNOSIS — R63.4 WEIGHT LOSS: Primary | ICD-10-CM

## 2024-11-13 LAB
EST. AVERAGE GLUCOSE BLD GHB EST-MCNC: 131.2 MG/DL (ref 70–115)
HBA1C MFR BLD: 6.2 % (ref 4–6)

## 2024-11-13 NOTE — TELEPHONE ENCOUNTER
All of his blood work was normal except his blood sugar was a little bit elevated (150, normal less than 110)     See if we can get a hemoglobin A1c added to his blood work.       I would like for him to also have a chest x-ray performed and have placed the order for that

## 2024-11-20 ENCOUNTER — TELEPHONE (OUTPATIENT)
Dept: FAMILY MEDICINE | Facility: CLINIC | Age: 89
End: 2024-11-20
Payer: OTHER GOVERNMENT

## 2024-11-20 ENCOUNTER — HOSPITAL ENCOUNTER (OUTPATIENT)
Dept: RADIOLOGY | Facility: HOSPITAL | Age: 89
Discharge: HOME | End: 2024-11-20
Attending: FAMILY MEDICINE
Payer: OTHER GOVERNMENT

## 2024-11-20 DIAGNOSIS — R63.4 WEIGHT LOSS: ICD-10-CM

## 2024-11-20 PROCEDURE — 71046 X-RAY EXAM CHEST 2 VIEWS: CPT | Mod: TC

## 2024-11-20 NOTE — TELEPHONE ENCOUNTER
----- Message from Abhijit Montesinos MD sent at 11/20/2024  4:48 PM CST -----  His labs and chest x-ray were unremarkable.    Tell him to try and improve his protein intake in the next 4 weeks by using protein rich snacks like peanut butter    Have him return in 4 weeks so that we can reassess his weight

## 2024-11-22 DIAGNOSIS — J44.9 COPD (CHRONIC OBSTRUCTIVE PULMONARY DISEASE): Primary | ICD-10-CM

## 2024-12-02 ENCOUNTER — OFFICE VISIT (OUTPATIENT)
Dept: FAMILY MEDICINE | Facility: CLINIC | Age: 89
End: 2024-12-02
Payer: MEDICARE

## 2024-12-02 VITALS
HEIGHT: 75 IN | SYSTOLIC BLOOD PRESSURE: 102 MMHG | WEIGHT: 163.19 LBS | TEMPERATURE: 97 F | HEART RATE: 55 BPM | DIASTOLIC BLOOD PRESSURE: 62 MMHG | OXYGEN SATURATION: 98 % | BODY MASS INDEX: 20.29 KG/M2

## 2024-12-02 DIAGNOSIS — J41.0 SIMPLE CHRONIC BRONCHITIS: ICD-10-CM

## 2024-12-02 DIAGNOSIS — R63.0 ANOREXIA: ICD-10-CM

## 2024-12-02 DIAGNOSIS — R63.4 WEIGHT LOSS: Primary | ICD-10-CM

## 2024-12-02 DIAGNOSIS — I44.4 LEFT ANTERIOR FASCICULAR BLOCK (LAFB): ICD-10-CM

## 2024-12-02 DIAGNOSIS — R10.84 GENERALIZED POSTPRANDIAL ABDOMINAL PAIN: ICD-10-CM

## 2024-12-02 DIAGNOSIS — I45.10 RIGHT BUNDLE BRANCH BLOCK (RBBB): ICD-10-CM

## 2024-12-02 PROCEDURE — 99214 OFFICE O/P EST MOD 30 MIN: CPT | Mod: ,,, | Performed by: FAMILY MEDICINE

## 2024-12-02 NOTE — PROGRESS NOTES
"SUBJECTIVE:  HPI    Dejon Gallegos is a 89 y.o. male here for Anorexia and Fatigue.     November 12, 2024 office note reviewed.  Here for follow-up anorexia and weight loss.  He has continued to lose some weight over the last several months.  In the office, his weight is down from 178 lb in August 2024 to 163 lb today (5 lb in the last 2-1/2 weeks).    Labs including CBC, CMP, PSA, TSH, free T4 were all really unremarkable.  Chest x-ray showed hyperexpanded lung fields and changes consistent with COPD.  PFTs showed mild obstructive lung disease without bronchodilator change.    Patient does report that when he eats solid food he has some generalized central abdominal pain that lasts for about 1-2 hours.  He continues to have anorexia and has continued to have some weight loss.  He says he does not really have any appetite at all.    He also had a recent CT of the abdomen and pelvis in August to evaluate for some microscopic hematuria and it was unremarkable.    His last colonoscopy was many years ago and he has not had a cystoscopy and many years.      Dejon's allergies, medications, history, and problem list were updated as appropriate.    ROS:  Pertinent ROS as above, otherwise negative    OBJECTIVE:  Vital signs  Visit Vitals  /62 (BP Location: Right arm, Patient Position: Sitting)   Pulse (!) 55   Temp 96.7 °F (35.9 °C) (Temporal)   Ht 6' 3" (1.905 m)   Wt 74 kg (163 lb 3.2 oz)   SpO2 98%   BMI 20.40 kg/m²          PHYSICAL EXAM:  General: Awake, alert, no acute distress, thin, weight down 5 lb since November 12th      ASSESSMENT/PLAN:  1. Weight loss  -     CTA Abdomen; Future; Expected date: 12/02/2024    2. Anorexia  -     CTA Abdomen; Future; Expected date: 12/02/2024    3. Generalized postprandial abdominal pain  Assessment & Plan:  In light of his atherosclerosis seen on his recent CT of the abdomen and pelvis in August and the nature of his symptoms with anorexia and weight loss and postprandial " abdominal pain, I believe he needs a CT angiogram to rule out bowel ischemia.    I discussed this in detail with the patient and his granddaughter who was here with him today.    If the CT angiogram is unremarkable, the next step in his evaluation I believe would be an EGD and colonoscopy.    Further down in the differential diagnosis would be bladder cancer.      Otherwise, the patient has been effectively worked up.    Orders:  -     CTA Abdomen; Future; Expected date: 12/02/2024    4. Simple chronic bronchitis  Overview:  November 2024 PFTs: mild obstructive lung disease without bronchodilator change      5. Right bundle branch block (RBBB)  Overview:  December 2, 2024 EKG      6. Left anterior fascicular block (LAFB)  Overview:  December 2, 2024 EKG          We will call with results of CT angiogram of the abdomen.

## 2024-12-02 NOTE — ASSESSMENT & PLAN NOTE
In light of his atherosclerosis seen on his recent CT of the abdomen and pelvis in August and the nature of his symptoms with anorexia and weight loss and postprandial abdominal pain, I believe he needs a CT angiogram to rule out bowel ischemia.    I discussed this in detail with the patient and his granddaughter who was here with him today.    If the CT angiogram is unremarkable, the next step in his evaluation I believe would be an EGD and colonoscopy.    Further down in the differential diagnosis would be bladder cancer.      Otherwise, the patient has been effectively worked up.

## 2024-12-06 ENCOUNTER — HOSPITAL ENCOUNTER (OUTPATIENT)
Dept: RADIOLOGY | Facility: HOSPITAL | Age: 89
Discharge: HOME | End: 2024-12-06
Attending: FAMILY MEDICINE
Payer: MEDICARE

## 2024-12-06 DIAGNOSIS — R63.0 ANOREXIA: ICD-10-CM

## 2024-12-06 DIAGNOSIS — R10.84 GENERALIZED POSTPRANDIAL ABDOMINAL PAIN: ICD-10-CM

## 2024-12-06 DIAGNOSIS — R63.4 WEIGHT LOSS: ICD-10-CM

## 2024-12-06 PROCEDURE — 25500020 PHARM REV CODE 255: Performed by: FAMILY MEDICINE

## 2024-12-06 PROCEDURE — 74175 CTA ABDOMEN W/CONTRAST: CPT | Mod: TC

## 2024-12-06 RX ADMIN — IOHEXOL 95 ML: 350 INJECTION, SOLUTION INTRAVENOUS at 04:12

## 2024-12-11 ENCOUNTER — TELEPHONE (OUTPATIENT)
Dept: FAMILY MEDICINE | Facility: CLINIC | Age: 89
End: 2024-12-11
Payer: MEDICARE

## 2024-12-11 NOTE — TELEPHONE ENCOUNTER
----- Message from Abhijit Montesinos MD sent at 12/11/2024  8:48 AM CST -----  CT ok.  Should keep appt on 12/18/24

## 2025-01-03 ENCOUNTER — TELEPHONE (OUTPATIENT)
Dept: FAMILY MEDICINE | Facility: CLINIC | Age: 89
End: 2025-01-03
Payer: MEDICARE

## 2025-01-03 NOTE — TELEPHONE ENCOUNTER
"Pt grand-daughter called stating that she wanted his CT scan & that her grandfather has cancer & that they are filing a lawsuit with everyone who told them that he was OK. When I tried to explain that she would need to get the disk from the hospital she proceeded to raise her voice and tell me that I was going to sit there and listen to her. Stated that I was part of the problem due to me working here and that "this office is killing people and I don't know why yall are even in business". She proceeded to raise her voice again & when explained that I would put in a msg to the nurse to speak with the dr, she again told me I was the problem and hung up on me.   "